# Patient Record
Sex: FEMALE | Race: WHITE | NOT HISPANIC OR LATINO | Employment: OTHER | URBAN - METROPOLITAN AREA
[De-identification: names, ages, dates, MRNs, and addresses within clinical notes are randomized per-mention and may not be internally consistent; named-entity substitution may affect disease eponyms.]

---

## 2024-01-03 PROBLEM — I25.10 CORONARY ARTERY DISEASE INVOLVING NATIVE CORONARY ARTERY OF NATIVE HEART WITHOUT ANGINA PECTORIS: Status: ACTIVE | Noted: 2024-01-03

## 2024-01-03 PROBLEM — R07.9 CHEST PAIN: Status: ACTIVE | Noted: 2024-01-03

## 2024-01-03 PROBLEM — I65.23 BILATERAL CAROTID ARTERY STENOSIS: Status: ACTIVE | Noted: 2024-01-03

## 2024-01-03 PROBLEM — I05.0 MITRAL VALVE STENOSIS: Status: ACTIVE | Noted: 2024-01-03

## 2024-01-03 PROBLEM — R00.2 PALPITATIONS: Status: ACTIVE | Noted: 2024-01-03

## 2024-01-03 PROBLEM — E03.9 HYPOTHYROIDISM: Status: ACTIVE | Noted: 2024-01-03

## 2024-01-03 PROBLEM — I50.32 CHRONIC DIASTOLIC CONGESTIVE HEART FAILURE (MULTI): Status: ACTIVE | Noted: 2024-01-03

## 2024-01-03 PROBLEM — E78.5 DYSLIPIDEMIA (HIGH LDL; LOW HDL): Status: ACTIVE | Noted: 2024-01-03

## 2024-01-03 PROBLEM — I25.2 HISTORY OF INFERIOR WALL MYOCARDIAL INFARCTION: Status: ACTIVE | Noted: 2024-01-03

## 2024-01-03 PROBLEM — I10 ESSENTIAL HYPERTENSION: Status: ACTIVE | Noted: 2024-01-03

## 2024-01-03 PROBLEM — E11.9 DIABETES (MULTI): Status: ACTIVE | Noted: 2024-01-03

## 2024-01-03 RX ORDER — TORSEMIDE 20 MG/1
2 TABLET ORAL DAILY
COMMUNITY
End: 2024-02-12 | Stop reason: SDUPTHER

## 2024-01-03 RX ORDER — ROSUVASTATIN CALCIUM 40 MG/1
1 TABLET, COATED ORAL NIGHTLY
COMMUNITY
End: 2024-02-12 | Stop reason: SDUPTHER

## 2024-01-03 RX ORDER — CALCITRIOL 0.25 UG/1
0.25 CAPSULE ORAL
COMMUNITY

## 2024-01-03 RX ORDER — PANTOPRAZOLE SODIUM 20 MG/1
1 TABLET, DELAYED RELEASE ORAL DAILY
COMMUNITY

## 2024-01-03 RX ORDER — GLIPIZIDE 2.5 MG/1
1 TABLET, EXTENDED RELEASE ORAL DAILY
COMMUNITY

## 2024-01-03 RX ORDER — NITROGLYCERIN 0.4 MG/1
0.4 TABLET SUBLINGUAL EVERY 5 MIN PRN
COMMUNITY
End: 2024-02-12 | Stop reason: SDUPTHER

## 2024-01-03 RX ORDER — METOPROLOL SUCCINATE 100 MG/1
1 TABLET, EXTENDED RELEASE ORAL DAILY
COMMUNITY
End: 2024-02-12 | Stop reason: SDUPTHER

## 2024-01-03 RX ORDER — ALLOPURINOL 100 MG/1
1 TABLET ORAL DAILY
COMMUNITY

## 2024-01-03 RX ORDER — LEVOTHYROXINE SODIUM 125 UG/1
1 TABLET ORAL DAILY
COMMUNITY
Start: 2022-01-20

## 2024-01-03 RX ORDER — CLOPIDOGREL BISULFATE 75 MG/1
1 TABLET ORAL DAILY
COMMUNITY
End: 2024-02-12 | Stop reason: SDUPTHER

## 2024-02-12 ENCOUNTER — OFFICE VISIT (OUTPATIENT)
Dept: CARDIOLOGY | Facility: CLINIC | Age: 84
End: 2024-02-12
Payer: MEDICARE

## 2024-02-12 VITALS
WEIGHT: 172.9 LBS | DIASTOLIC BLOOD PRESSURE: 72 MMHG | SYSTOLIC BLOOD PRESSURE: 118 MMHG | HEIGHT: 66 IN | BODY MASS INDEX: 27.79 KG/M2 | HEART RATE: 74 BPM

## 2024-02-12 DIAGNOSIS — I50.32 CHRONIC DIASTOLIC CONGESTIVE HEART FAILURE (MULTI): ICD-10-CM

## 2024-02-12 DIAGNOSIS — I47.10 PSVT (PAROXYSMAL SUPRAVENTRICULAR TACHYCARDIA) (CMS-HCC): ICD-10-CM

## 2024-02-12 DIAGNOSIS — I05.0 MITRAL VALVE STENOSIS, UNSPECIFIED ETIOLOGY: ICD-10-CM

## 2024-02-12 DIAGNOSIS — Z87.891 FORMER SMOKER: ICD-10-CM

## 2024-02-12 DIAGNOSIS — I10 ESSENTIAL HYPERTENSION: ICD-10-CM

## 2024-02-12 DIAGNOSIS — E78.5 DYSLIPIDEMIA (HIGH LDL; LOW HDL): ICD-10-CM

## 2024-02-12 DIAGNOSIS — I65.23 BILATERAL CAROTID ARTERY STENOSIS: ICD-10-CM

## 2024-02-12 DIAGNOSIS — E11.9 TYPE 2 DIABETES MELLITUS WITHOUT COMPLICATION, WITHOUT LONG-TERM CURRENT USE OF INSULIN (MULTI): ICD-10-CM

## 2024-02-12 DIAGNOSIS — K27.9 PUD (PEPTIC ULCER DISEASE): ICD-10-CM

## 2024-02-12 DIAGNOSIS — N18.30 STAGE 3 CHRONIC KIDNEY DISEASE, UNSPECIFIED WHETHER STAGE 3A OR 3B CKD (MULTI): ICD-10-CM

## 2024-02-12 DIAGNOSIS — I48.0 PAROXYSMAL ATRIAL FIBRILLATION (MULTI): ICD-10-CM

## 2024-02-12 DIAGNOSIS — R00.2 PALPITATIONS: ICD-10-CM

## 2024-02-12 DIAGNOSIS — I25.10 CORONARY ARTERY DISEASE INVOLVING NATIVE CORONARY ARTERY OF NATIVE HEART WITHOUT ANGINA PECTORIS: ICD-10-CM

## 2024-02-12 DIAGNOSIS — R60.9 EDEMA, UNSPECIFIED TYPE: Primary | ICD-10-CM

## 2024-02-12 PROBLEM — N25.81 HYPERPARATHYROIDISM DUE TO RENAL INSUFFICIENCY (MULTI): Status: ACTIVE | Noted: 2019-01-18

## 2024-02-12 PROBLEM — I13.10 HYPERTENSIVE HEART AND CHRONIC KIDNEY DISEASE WITHOUT HEART FAILURE, WITH STAGE 1 THROUGH STAGE 4 CHRONIC KIDNEY DISEASE, OR UNSPECIFIED CHRONIC KIDNEY DISEASE: Status: ACTIVE | Noted: 2019-01-18

## 2024-02-12 PROBLEM — N17.9 ACUTE RENAL FAILURE SYNDROME (CMS-HCC): Status: ACTIVE | Noted: 2019-01-18

## 2024-02-12 PROBLEM — R00.0 WIDE-COMPLEX TACHYCARDIA: Status: ACTIVE | Noted: 2024-02-12

## 2024-02-12 PROCEDURE — 3074F SYST BP LT 130 MM HG: CPT | Performed by: INTERNAL MEDICINE

## 2024-02-12 PROCEDURE — 1159F MED LIST DOCD IN RCRD: CPT | Performed by: INTERNAL MEDICINE

## 2024-02-12 PROCEDURE — 99214 OFFICE O/P EST MOD 30 MIN: CPT | Performed by: INTERNAL MEDICINE

## 2024-02-12 PROCEDURE — 3078F DIAST BP <80 MM HG: CPT | Performed by: INTERNAL MEDICINE

## 2024-02-12 PROCEDURE — 1036F TOBACCO NON-USER: CPT | Performed by: INTERNAL MEDICINE

## 2024-02-12 RX ORDER — LISINOPRIL 5 MG/1
5 TABLET ORAL DAILY
COMMUNITY

## 2024-02-12 RX ORDER — NITROGLYCERIN 0.4 MG/1
0.4 TABLET SUBLINGUAL EVERY 5 MIN PRN
Qty: 25 TABLET | Refills: 5 | Status: SHIPPED | OUTPATIENT
Start: 2024-02-12 | End: 2025-02-11

## 2024-02-12 RX ORDER — CLOPIDOGREL BISULFATE 75 MG/1
75 TABLET ORAL DAILY
Qty: 90 TABLET | Refills: 3 | Status: SHIPPED | OUTPATIENT
Start: 2024-02-12 | End: 2025-02-11

## 2024-02-12 RX ORDER — METOPROLOL SUCCINATE 100 MG/1
100 TABLET, EXTENDED RELEASE ORAL DAILY
Qty: 90 TABLET | Refills: 3 | Status: SHIPPED | OUTPATIENT
Start: 2024-02-12 | End: 2025-02-11

## 2024-02-12 RX ORDER — ROSUVASTATIN CALCIUM 40 MG/1
40 TABLET, COATED ORAL NIGHTLY
Qty: 90 TABLET | Refills: 3 | Status: SHIPPED | OUTPATIENT
Start: 2024-02-12 | End: 2025-02-11

## 2024-02-12 NOTE — PROGRESS NOTES
CARDIOLOGY OFFICE VISIT      CHIEF COMPLAINT  Chief Complaint   Patient presents with    Follow-up     1 year follow up        HISTORY OF PRESENT ILLNESS  The patient is a 83-year-old  female with past medical history significant for coronary artery disease, carotid artery stenosis, hypertension, hyperlipidemia, who presents for followup cardiovascular care.      Patient denies chest pain, palpitations, nausea, vomiting, dizziness, near-syncope. Patient has chronic lower extremity edema left greater than right. Denies bleeding. No formal exercise. Patient lives in [place] a 45 mile drive from here.   No formal exercise program.        PAST SURGICAL HISTORY  Thyroidectomy.  Tubal ligation.   Left upper extremity fistula  Left total hip replacement        REVIEW OF SYSTEMS: Negative or noncontributory as previously mentioned Ã--12 systems.      SOCIAL HISTORY: Denies alcohol. Quit smoking 14 years ago. Was a one-pack per  day for 30 years smoker.      FAMILY HISTORY: Significant for dad  at 70 with cerebrovascular  accident. Mom  at 91 with congestive heart failure. Brother   at 74 with coronary artery disease.      I personally reviewed EKG 2023: Normal sinus rhythm with frequent premature atrial contractions, ventricular conduction delay     ASSESSMENT:   Wide-complex tachycardia- declines cardiac catheterization  Possible paroxysmal atrial fibrillation-declines anticoagulation  Paroxysmal supraventricular tachycardia  Coronary artery disease, status post multivessel angioplasty, most recent drug-eluting stent, first obtuse marginal branch 2006, chronically occluded, dominant right coronary artery.  Acute on chronic diastolic congestive heart failure [place] heart association class II  Carotid artery stenosis status post left internal carotid artery stent, Olya 3, 2015.  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  Lambl's excrescences of  aortic valve.  History of inferior wall myocardial infarction.  Hypertension.  Hyperlipidemia.  Diabetes  Peptic ulcer disease.  Renal insufficiency-follows with Dr Llanos in [place]   Hypothyroid.     RECOMMENDATIONS:   I have had  lengthy discussions regarding the findings on previous 28-day event monitor. She is having wide-complex tachycardia, possible brief episodes of atrial fibrillation, and SVT. I explained that the wide-complex tachycardia or ventricular tachycardia is most commonly associated with severe occlusive coronary artery disease. She did have an abnormal stress test last year that was consistent with her chronically occluded RCA. She has no current symptoms of angina. I offered cardiac catheterization to further evaluate due to concerns that ventricular tachycardia cannot lead to sudden death. She is more concerned about declining renal function with the use of contrast. I also discussed my concerns of future stroke and recommending anticoagulation warfarin. Currently she continues to decline. She has had an embolic event in the past and her valvular heart disease, severely dilated left atrium, and Holter findings would put her at increased risk for future embolic events.  For her edema we will prescribe compression stockings knee-high 15-20 mmHg.  Follow-up with myself in 1 year.  Routine lab work through PCP at [facility].     Please excuse any errors in grammar or translation related to this dictation. Voice recognition software was utilized to prepare this document.        Recent Cardiovascular Testing:  The following results have been reviewed and updated.     Cardiac cath: 5/17/18  1. MICHELLE 50-69%.  2. LICA less than 50%.     MPX: 9/8/21  1. Mild inferolateral wall aminah-infarct myocardial ischemia.  2. Moderate inferolateral wall myocardial infarction.  3. EF 65%     Labs: 7/21/21  1., Trig 139, LDL 53 , HDL 34      DEVIKA 5/6/2015  1.EF 65%  2.Moderate septal hypertrophy  3. Lambl's  excretion  4.Thickened Mitral valve  5.Aortic valve trileaflet     Echo: 9/8/21  1. EF 60-65%  2. Left atrium severely dilated.  3. mild to moderate Mitral valve stenosis.  4. Moderately elevated pulmonary artery pressure.  5. Mitral valve stenosis, mean and peak gradients 6.0 mmHg and 15.6 mmHg.  6. Mild MR.     CRD: 9/8/21  1. Mild carotid artery stenosis.    VITALS  Vitals:    02/12/24 1252   BP: 118/72   Pulse: 74     Wt Readings from Last 4 Encounters:   02/12/24 78.4 kg (172 lb 14.4 oz)   02/13/23 79.5 kg (175 lb 5 oz)   10/17/22 81.8 kg (180 lb 7 oz)   07/18/22 84.8 kg (187 lb)       PHYSICAL EXAM:  GENERAL:  Well developed, well nourished, in no acute distress.  CHEST:  Symmetric and nontender.  NEURO/PSYCH:  Alert and oriented times three with approppriate behavior and responses.  NECK:  Supple, no JVD, no bruit.  LUNGS:  Clear to auscultation bilaterally, normal respiratory effort.  HEART:  Rate and rhythm regular with 2/6 KESHA at LSB, no gallop appreciated.    There are no rubs, clicks or heaves.  EXTREMITIES:  Warm with good color, no clubbing or cyanosis.  There is 1+ bilateral lower extremity edema noted.  PERIPHERAL VASCULAR:  Pulses present and equally palpable; 2+ throughout.    ASSESSMENT AND PLAN  Diagnoses and all orders for this visit:  Palpitations  Paroxysmal atrial fibrillation (CMS/[facility])  PSVT (paroxysmal supraventricular tachycardia)  Coronary artery disease involving native coronary artery of native heart without angina pectoris  Chronic diastolic congestive heart failure (CMS/[facility])  Bilateral carotid artery stenosis  Essential hypertension  Dyslipidemia (high LDL; low HDL)  Mitral valve stenosis, unspecified etiology  Type 2 diabetes mellitus without complication, without long-term current use of insulin (CMS/[facility])  PUD (peptic ulcer disease)  Stage 3 chronic kidney disease, unspecified whether stage 3a or 3b CKD (CMS/[facility])  BMI 27.0-27.9,adult  Former smoker      Past Medical History  Past  Medical History:   Diagnosis Date    Coronary artery disease     Diabetes mellitus (CMS/HCC)     Hyperlipidemia     Hypertension        Social History  Social History     Tobacco Use    Smoking status: Former     Types: Cigarettes     Quit date:      Years since quittin.1     Passive exposure: Never    Smokeless tobacco: Never   Substance Use Topics    Alcohol use: Never    Drug use: Never       Family History     Family History   Problem Relation Name Age of Onset    Heart failure Mother      Coronary artery disease Father      Stroke Father      Diabetes Sister      Coronary artery disease Brother          Allergies:  Allergies   Allergen Reactions    Atorvastatin Myalgia    Ciprofloxacin Unknown    Ezetimibe-Simvastatin Myalgia    Fenofibrate Myalgia    Nitrofurantoin Monohyd/M-Cryst Unknown    Statins-Hmg-Coa Reductase Inhibitors Myalgia        Outpatient Medications:  Current Outpatient Medications   Medication Instructions    allopurinol (Zyloprim) 100 mg tablet 1 tablet, oral, Daily    calcitriol (ROCALTROL) 0.25 mcg, oral, Take one capsule on Mon, Wed, Fri    clopidogrel (Plavix) 75 mg tablet 1 tablet, oral, Daily    glipiZIDE XL (Glucotrol XL) 2.5 mg 24 hr tablet 1 tablet, oral, Daily    levothyroxine (Synthroid, Levoxyl) 125 mcg tablet 1 tablet, oral, Daily    lisinopril 5 mg, oral, Daily    metoprolol succinate XL (Toprol-XL) 100 mg 24 hr tablet 1 tablet, oral, Daily    nitroglycerin (NITROSTAT) 0.4 mg, sublingual, Every 5 min PRN    pantoprazole (ProtoNix) 20 mg EC tablet 1 tablet, oral, Daily    rosuvastatin (Crestor) 40 mg tablet 1 tablet, oral, Nightly    SITagliptin phosphate (Januvia) 50 mg tablet 1 tablet, oral, Daily    torsemide (Demadex) 20 mg tablet 2 tablets, oral, Daily        Recent Lab Results:    CMP:    Lab Results   Component Value Date     2021    K 4.1 2021    CL 99 2021    CO2 27 2021    BUN 51 (H) 2021    CREATININE 3.30 (H) 2021     "GLUCOSE 168 (H) 07/19/2021    CALCIUM 9.5 07/19/2021       Magnesium:    No results found for: \"MG\"    Lipid Profile:    Lab Results   Component Value Date    TRIG 139 07/19/2021    HDL 34.0 (A) 07/19/2021       TSH:    Lab Results   Component Value Date    TSH 1.08 05/23/2019       BNP:   No results found for: \"BNP\"     PT/INR:    No results found for: \"PROTIME\", \"INR\"    HgBA1c:    Lab Results   Component Value Date    HGBA1C 8.3 07/26/2021       BMP:  Lab Results   Component Value Date     07/19/2021     05/28/2020     05/23/2019    K 4.1 07/19/2021    K 4.2 05/28/2020    K 3.8 05/23/2019    CL 99 07/19/2021    CL 99 05/28/2020     05/23/2019    CO2 27 07/19/2021    CO2 28 05/28/2020    CO2 27 05/23/2019    BUN 51 (H) 07/19/2021    BUN 43 (H) 05/28/2020    BUN 39 (H) 05/23/2019    CREATININE 3.30 (H) 07/19/2021    CREATININE 2.78 (H) 05/28/2020    CREATININE 2.14 (H) 05/23/2019       CBC:  No results found for: \"WBC\", \"RBC\", \"HGB\", \"HCT\", \"MCV\", \"MCH\", \"MCHC\", \"RDW\", \"PLT\", \"MPV\"    Cardiac Enzymes:    No results found for: \"TROPHS\"    Hepatic Function Panel:    Lab Results   Component Value Date    ALKPHOS 75 07/19/2021    ALT 5 (L) 07/19/2021    AST 10 07/19/2021    PROT 6.9 07/19/2021    BILITOT 0.4 07/19/2021           Silverio Lawton, DO         "

## 2024-02-12 NOTE — PATIENT INSTRUCTIONS
FOLLOW UP IN 1 YEAR    COMPRESSION STOCKINGS ORDERED  
Interventions: coordination of care Monitoring/evaluation: nutrient intake, weight, electrolyte profile/yes

## 2024-02-14 ENCOUNTER — TELEPHONE (OUTPATIENT)
Dept: CARDIOLOGY | Facility: CLINIC | Age: 84
End: 2024-02-14
Payer: MEDICARE

## 2024-10-10 ENCOUNTER — TELEPHONE (OUTPATIENT)
Dept: CARDIOLOGY | Facility: CLINIC | Age: 84
End: 2024-10-10
Payer: MEDICARE

## 2024-10-10 NOTE — TELEPHONE ENCOUNTER
Received referral from LINDY Franklin. This is a patient of Dr. Lawton. I called patient and she advised she is wanting to switch to a provider in [place] and would like to see Dr. Bairon Rizvi MD.   Dr. Lawton last saw patient February 2024 and recommended a 1-year follow up.   Ok to add patient to your schedule? If so, schedule for February 2025 or sooner?

## 2024-11-15 LAB
NON-UH HIE ALANINE AMINOTRANSFERASE:CCNC:PT:SER/PLAS:QN:NO ADDITION OF P-5': 7 INT._UNIT/L (ref 6–46)
NON-UH HIE ALBUMIN/GLOBULIN:MCRTO:PT:SER:QN:: 1.1 (ref 1.1–2.2)
NON-UH HIE ALBUMIN:MCNC:PT:SER/PLAS:QN:: 3.7 GM/DL (ref 3.3–5)
NON-UH HIE ALKALINE PHOSPHATASE:CCNC:PT:SER/PLAS:QN:: 93 INT._UNIT/L (ref 21–98)
NON-UH HIE ANION GAP:SCNC:PT:SER/PLAS:QN:: 16 MEQ/L (ref 6–16)
NON-UH HIE ASPARTATE AMINOTRANSFERASE:CCNC:PT:SER/PLAS:QN:: 19 INT._UNIT/L (ref 5–43)
NON-UH HIE BASOPHILS/LEUKOCYTES:NFR.DF:PT:BLD:QN:AUTOMATED COUNT: 0 E9/L (ref 0–0.2)
NON-UH HIE BASOPHILS:NCNC:PT:BLD:QN:AUTOMATED COUNT: 0.2 % (ref 0–2)
NON-UH HIE BILIRUBIN.GLUCURONIDATED+BILIRUBIN.ALBUMIN BOUND:MCNC:PT:SER/PLA: 0.3 MG/DL (ref 0–0.4)
NON-UH HIE BILIRUBIN.NON-GLUCURONIDATED:MSCNC:PT:SER/PLAS:QN:: 0.6 MG/DL (ref 0.1–0.9)
NON-UH HIE BILIRUBIN:MCNC:PT:SER/PLAS:QN:: 0.9 MG/DL (ref 0–1.1)
NON-UH HIE CALCIUM:MCNC:PT:SER/PLAS:QN:: 9.6 MG/DL (ref 8.9–11.1)
NON-UH HIE CARBON DIOXIDE:SCNC:PT:SER/PLAS:QN:: 27 MMOL/L (ref 21–31)
NON-UH HIE CHLORIDE:SCNC:PT:SER/PLAS:QN:: 96 MMOL/L (ref 101–111)
NON-UH HIE COAGULATION SURFACE INDUCED:TIME:PT:PPP:QN:COAG: 30.3 SECOND(S) (ref 25.1–36.5)
NON-UH HIE COAGULATION TISSUE FACTOR INDUCED.INR:RELTIME:PT:PPP:QN:COAG: 1.41
NON-UH HIE COAGULATION TISSUE FACTOR INDUCED:TIME:PT:PPP:QN:COAG: 15.9 SECOND(S) (ref 9.4–12.5)
NON-UH HIE CREATININE:MCNC:PT:SER/PLAS:QN:: 2 MG/DL (ref 0.5–1.3)
NON-UH HIE EGFR: 24 ML/MIN/1.73 M2
NON-UH HIE EOSINOPHILS/100 LEUKOCYTES:NFR:PT:BLD:QN:AUTOMATED COUNT: 0 % (ref 0–8)
NON-UH HIE EOSINOPHILS:NCNC:PT:BLD:QN:: 0 E9/L (ref 0–0.5)
NON-UH HIE ERYTHROCYTE DISTRIBUTION WIDTH:RATIO:PT:RBC:QN:AUTOMATED COUNT: 17.1 % (ref 10.9–14.2)
NON-UH HIE ERYTHROCYTE MEAN CORPUSCULAR HEMOGLOBIN CONCENTRATION:MCNC:PT:RB: 32.9 GM/DL (ref 31.4–36)
NON-UH HIE ERYTHROCYTE MEAN CORPUSCULAR HEMOGLOBIN:ENTMASS:PT:RBC:QN:AUTOMA: 29.6 PG (ref 27–34)
NON-UH HIE ERYTHROCYTE MEAN CORPUSCULAR VOLUME:ENTVOL:PT:RBC:QN:AUTOMATED C: 89.9 FL (ref 80–100)
NON-UH HIE ERYTHROCYTES:NCNC:PT:BLD:QN:AUTOMATED COUNT: 3.7 E12/L (ref 4.3–5.9)
NON-UH HIE ETHANOL LVL: <10 MG/DL
NON-UH HIE GLOBULIN:MCNC:PT:SER:QN:CALCULATED: 3.4 GM/DL (ref 1.4–4)
NON-UH HIE GLUCOSE:MCNC:PT:SER/PLAS:QN:: 254 MG/DL (ref 55–199)
NON-UH HIE HEMATOCRIT:VFR:PT:BLD:QN:AUTOMATED COUNT: 33 % (ref 34–46)
NON-UH HIE HEMOGLOBIN:MCNC:PT:BLD:QN:: 10.9 GM/DL (ref 12–16)
NON-UH HIE LACTIC ACID LVL: 2.2 MMOL/L (ref 0.5–2.2)
NON-UH HIE LEUKOCYTES: 10.8 E9/L (ref 4–11)
NON-UH HIE LYMPHOCYTES:NCNC:PT:BLD:QN:: 0.2 E9/L (ref 1–4)
NON-UH HIE LYMPHOCYTES:NCNC:PT:BLD:QN:AUTOMATED COUNT: 2.2 % (ref 14–50)
NON-UH HIE MONOCYTES:NCNC:PT:BLD:QN:AUTOMATED COUNT: 0.6 E9/L (ref 0.2–1)
NON-UH HIE NEUTROPHILS/100 LEUKOCYTES:NFR:PT:BLD:QN:: 92.2 % (ref 36–75)
NON-UH HIE NEUTROPHILS:NCNC:PT:BLD:QN:AUTOMATED COUNT: 10 E9/L (ref 2–7.5)
NON-UH HIE PLATELET MEAN VOLUME:ENTVOL:PT:BLD:QN:AUTOMATED COUNT: 7.7 FL (ref 6.4–10.8)
NON-UH HIE PLATELETS:NCNC:PT:BLD:QN:AUTOMATED COUNT: 217 E9/L (ref 150–500)
NON-UH HIE POTASSIUM:SCNC:PT:SER/PLAS:QN:: 3.8 MMOL/L (ref 3.5–5.3)
NON-UH HIE PROTEIN:MCNC:PT:SER/PLAS:QN:: 7.1 GM/DL (ref 6–7.8)
NON-UH HIE SODIUM:SCNC:PT:SER/PLAS:QN:: 135 MMOL/L (ref 135–145)
NON-UH HIE TOTAL CK: 479 INT._UNIT/L (ref 14–261)
NON-UH HIE TRIACYLGLYCEROL LIPASE:CCNC:PT:SER/PLAS:QN:: 27 UNIT/L (ref 13–58)
NON-UH HIE TROPONIN: 327.2 PG/ML (ref 10.1–27.1)
NON-UH HIE UREA NITROGEN/CREATININE:MRTO:PT:SER/PLAS:QN:: 17 NO UNITS (ref 10–20)
NON-UH HIE UREA NITROGEN:MCNC:PT:SER/PLAS:QN:: 34 MG/DL (ref 5–21)

## 2025-02-03 ENCOUNTER — APPOINTMENT (OUTPATIENT)
Dept: CARDIOLOGY | Facility: CLINIC | Age: 85
End: 2025-02-03
Payer: MEDICARE

## 2025-02-03 VITALS
DIASTOLIC BLOOD PRESSURE: 64 MMHG | HEART RATE: 80 BPM | BODY MASS INDEX: 25.88 KG/M2 | WEIGHT: 161 LBS | HEIGHT: 66 IN | SYSTOLIC BLOOD PRESSURE: 130 MMHG

## 2025-02-03 DIAGNOSIS — I13.10 HYPERTENSIVE HEART AND CHRONIC KIDNEY DISEASE WITHOUT HEART FAILURE, WITH STAGE 1 THROUGH STAGE 4 CHRONIC KIDNEY DISEASE, OR UNSPECIFIED CHRONIC KIDNEY DISEASE: ICD-10-CM

## 2025-02-03 DIAGNOSIS — I50.32 CHRONIC DIASTOLIC CONGESTIVE HEART FAILURE: ICD-10-CM

## 2025-02-03 DIAGNOSIS — I25.10 CORONARY ARTERY DISEASE INVOLVING NATIVE CORONARY ARTERY OF NATIVE HEART WITHOUT ANGINA PECTORIS: ICD-10-CM

## 2025-02-03 DIAGNOSIS — I10 ESSENTIAL HYPERTENSION: ICD-10-CM

## 2025-02-03 DIAGNOSIS — E78.5 DYSLIPIDEMIA (HIGH LDL; LOW HDL): ICD-10-CM

## 2025-02-03 DIAGNOSIS — I05.0 MITRAL VALVE STENOSIS, UNSPECIFIED ETIOLOGY: ICD-10-CM

## 2025-02-03 DIAGNOSIS — E11.9 TYPE 2 DIABETES MELLITUS WITHOUT COMPLICATION, WITHOUT LONG-TERM CURRENT USE OF INSULIN (MULTI): ICD-10-CM

## 2025-02-03 DIAGNOSIS — I48.0 PAROXYSMAL ATRIAL FIBRILLATION (MULTI): ICD-10-CM

## 2025-02-03 DIAGNOSIS — I65.23 BILATERAL CAROTID ARTERY STENOSIS: ICD-10-CM

## 2025-02-03 DIAGNOSIS — Z87.891 FORMER SMOKER: ICD-10-CM

## 2025-02-03 PROCEDURE — 3078F DIAST BP <80 MM HG: CPT | Performed by: INTERNAL MEDICINE

## 2025-02-03 PROCEDURE — 1036F TOBACCO NON-USER: CPT | Performed by: INTERNAL MEDICINE

## 2025-02-03 PROCEDURE — 93000 ELECTROCARDIOGRAM COMPLETE: CPT | Performed by: INTERNAL MEDICINE

## 2025-02-03 PROCEDURE — 99215 OFFICE O/P EST HI 40 MIN: CPT | Performed by: INTERNAL MEDICINE

## 2025-02-03 PROCEDURE — 3075F SYST BP GE 130 - 139MM HG: CPT | Performed by: INTERNAL MEDICINE

## 2025-02-03 RX ORDER — LEVETIRACETAM 750 MG/1
750 TABLET ORAL 2 TIMES DAILY
COMMUNITY
Start: 2025-01-18

## 2025-02-03 RX ORDER — LANOLIN ALCOHOL/MO/W.PET/CERES
1000 CREAM (GRAM) TOPICAL DAILY
COMMUNITY

## 2025-02-03 RX ORDER — SITAGLIPTIN 25 MG/1
1 TABLET, FILM COATED ORAL
COMMUNITY
Start: 2025-01-27

## 2025-02-03 RX ORDER — DOCUSATE SODIUM 100 MG/1
1 CAPSULE, LIQUID FILLED ORAL
COMMUNITY
Start: 2024-11-22

## 2025-02-03 RX ORDER — ROSUVASTATIN CALCIUM 20 MG/1
20 TABLET, COATED ORAL NIGHTLY
COMMUNITY
Start: 2024-11-27

## 2025-02-03 RX ORDER — APIXABAN 2.5 MG/1
2.5 TABLET, FILM COATED ORAL 2 TIMES DAILY
COMMUNITY

## 2025-02-03 RX ORDER — METOPROLOL TARTRATE 25 MG/1
12.5 TABLET, FILM COATED ORAL 2 TIMES DAILY
COMMUNITY
Start: 2025-01-08

## 2025-02-03 RX ORDER — POTASSIUM CHLORIDE 750 MG/1
TABLET, EXTENDED RELEASE ORAL
COMMUNITY
Start: 2025-01-12

## 2025-02-03 RX ORDER — TORSEMIDE 20 MG/1
2 TABLET ORAL
COMMUNITY
Start: 2025-01-25 | End: 2025-02-03 | Stop reason: ENTERED-IN-ERROR

## 2025-02-03 NOTE — PROGRESS NOTES
Subjective   Michelle Fernandez is a 84 y.o. female            HPI   84-year-old white female who has previously followed with Dr. Silverio mosley and she is here now for follow-up, she lives in [place] and prefers close our office for visits.  She came with her son.  Since she saw Dr. Lawton last February she had a stroke in September 2024 and was sent to [place] but no intervention was needed.  She had an echocardiogram which revealed normal ejection fraction, she has history of coronary disease and previous stenting in the last cardiac catheterization 2015 revealed occlusion of the RCA with left-to-right collaterals and minimal disease on the left side.  She has permanent atrial fibrillation which was diagnosed back in September 2024.  She is on Eliquis 2.5 mg twice daily which is the right dose for her.  She has stage IV chronic kidney disease and GFR of 26 mL/min.  She uses a walker for ambulation and denies any palpitations, chest pain, bleeding complications and her diabetes seems to be under control A1c below 7.  Her examination was remarkable for irregular rhythm.  Her blood pressure is under control.  She had normal lung examination, no carotid bruits and no lower extremity edema.  Her previous records were extensively reviewed and shared with the patient and her son.  She continue to follow with local neurology and nephrology.  Assessment/recommendations:    1-permanent atrial fibrillation on Eliquis with controlled rate.  No changes are needed.  2-CAD status post previous PCI of the LAD, no recent imaging studies, last cardiac catheterization 2015.  She is on Plavix and high intensity statin.  No symptoms are present and no cardiac testing is needed  3-stage IV chronic kidney disease, avoid nephrotoxic medication and maintain adequate hydration  4-stroke September 2024 with no significant residual deficit with less than 70% stenosis in the carotid arteries.  Presently on high intensity statin and Plavix,  "non-smoker with controlled diabetes  5-essential hypertension, currently under control  6-dyslipidemia on high intensity statin to be monitored  7-type 2 diabetes under control managed by PCP  8-hypothyroidism on replacement therapy  9-high risk medication with Eliquis and Plavix with no active bleeding  Review of Systems   All other systems reviewed and are negative.           Vitals:    02/03/25 1259   BP: 130/64   BP Location: Right arm   Patient Position: Sitting   Pulse: 80   Weight: 73 kg (161 lb)   Height: 1.676 m (5' 6\")        Objective   Physical Exam  Constitutional:       Appearance: Normal appearance.   HENT:      Nose: Nose normal.   Neck:      Vascular: No carotid bruit.   Cardiovascular:      Rate and Rhythm: Normal rate.      Pulses: Normal pulses.      Heart sounds: Normal heart sounds.   Pulmonary:      Effort: Pulmonary effort is normal.   Abdominal:      General: Bowel sounds are normal.      Palpations: Abdomen is soft.   Musculoskeletal:         General: Normal range of motion.      Cervical back: Normal range of motion.      Right lower leg: No edema.      Left lower leg: No edema.   Skin:     General: Skin is warm and dry.   Neurological:      General: No focal deficit present.      Mental Status: She is alert.   Psychiatric:         Mood and Affect: Mood normal.         Behavior: Behavior normal.         Thought Content: Thought content normal.         Judgment: Judgment normal.         Allergies  Atorvastatin, Ciprofloxacin, Ezetimibe-simvastatin, Fenofibrate, Nitrofurantoin monohyd/m-cryst, and Statins-hmg-coa reductase inhibitors     Current Medications    Current Outpatient Medications:     allopurinol (Zyloprim) 100 mg tablet, Take 1 tablet (100 mg) by mouth once daily., Disp: , Rfl:     calcitriol (Rocaltrol) 0.25 mcg capsule, Take 1 capsule (0.25 mcg) by mouth. Take one capsule on Mon, Wed, Fri, Disp: , Rfl:     clopidogrel (Plavix) 75 mg tablet, Take 1 tablet (75 mg) by mouth once " daily., Disp: 90 tablet, Rfl: 3    cyanocobalamin (Vitamin B-12) 1,000 mcg tablet, Take 1 tablet (1,000 mcg) by mouth once daily., Disp: , Rfl:     docusate sodium (Colace) 100 mg capsule, Take 1 capsule (100 mg) by mouth every 12 hours., Disp: , Rfl:     Eliquis 2.5 mg tablet, 1 tablet (2.5 mg) 2 times a day., Disp: , Rfl:     Januvia 25 mg tablet, Take 1 tablet (25 mg) by mouth early in the morning.., Disp: , Rfl:     levETIRAcetam (Keppra) 750 mg tablet, Take 1 tablet (750 mg) by mouth 2 times a day., Disp: , Rfl:     levothyroxine (Synthroid, Levoxyl) 125 mcg tablet, Take 1 tablet (125 mcg) by mouth once daily., Disp: , Rfl:     metoprolol tartrate (Lopressor) 25 mg tablet, Take 0.5 tablets (12.5 mg) by mouth 2 times a day., Disp: , Rfl:     nitroglycerin (Nitrostat) 0.4 mg SL tablet, Place 1 tablet (0.4 mg) under the tongue every 5 minutes if needed for chest pain., Disp: 25 tablet, Rfl: 5    potassium chloride CR 10 mEq ER tablet, TAKE 1 TABLET BY MOUTH ONCE DAILY on days you take torsemide, Disp: , Rfl:     rosuvastatin (Crestor) 20 mg tablet, Take 1 tablet (20 mg) by mouth once daily at bedtime., Disp: , Rfl:     torsemide 40 mg tablet, Take 40 mg by mouth once daily., Disp: 90 tablet, Rfl: 3    metoprolol succinate XL (Toprol-XL) 100 mg 24 hr tablet, Take 1 tablet (100 mg) by mouth once daily., Disp: 90 tablet, Rfl: 3    rosuvastatin (Crestor) 40 mg tablet, Take 1 tablet (40 mg) by mouth once daily at bedtime., Disp: 90 tablet, Rfl: 3                     Assessment/Plan   1. Coronary artery disease involving native coronary artery of native heart without angina pectoris  Follow Up In Cardiology      2. Bilateral carotid artery stenosis        3. Chronic diastolic congestive heart failure        4. Essential hypertension        5. Mitral valve stenosis, unspecified etiology        6. Paroxysmal atrial fibrillation (Multi)  ECG 12 Lead      7. Hypertensive heart and chronic kidney disease without heart  failure, with stage 1 through stage 4 chronic kidney disease, or unspecified chronic kidney disease        8. Dyslipidemia (high LDL; low HDL)        9. Type 2 diabetes mellitus without complication, without long-term current use of insulin (Multi)        10. Former smoker        11. BMI 25.0-25.9,adult                 Scribe Attestation  By signing my name below, I, Adelaide GARCIA RN   , Scribe   attest that this documentation has been prepared under the direction and in the presence of Bairon Rizvi MD.     Provider Attestation - Scribe documentation    All medical record entries made by the Scribe were at my direction and personally dictated by me. I have reviewed the chart and agree that the record accurately reflects my personal performance of the history, physical exam, discussion and plan.

## 2025-02-03 NOTE — PATIENT INSTRUCTIONS
Please bring all medicines, vitamins, and herbal supplements with you when you come to the office.    Prescriptions will not be filled unless you are compliant with your follow up appointments or have a follow up appointment scheduled as per instruction of your physician. Refills should be requested at the time of your visit.     Fall Prevention Education Given     BMI was above normal measurement. Current weight: 73 kg (161 lb)  Weight change since last visit (-) denotes wt loss -11.9 lbs   Weight loss needed to achieve BMI 25: 6.4 Lbs  Weight loss needed to achieve BMI 30: -24.5 Lbs  Provided instructions on dietary changes  Provided instructions on exercise.    Call to confirm rosuvastatin dose whether it is 20 mg or 40 mg and to confirm what dose of torsemide you are taking

## 2025-02-03 NOTE — LETTER
February 3, 2025     Sandeep Rojo,   [address]  [facility] [address]    Patient: Michelle Fernandez   YOB: 1940   Date of Visit: 2/3/2025       Dear Dr. Sandeep Rojo, DO:    Thank you for referring Michelle Fernandez to me for evaluation. Below are my notes for this consultation.  If you have questions, please do not hesitate to call me. I look forward to following your patient along with you.       Sincerely,     Bairon Rizvi MD      CC: No Recipients  ______________________________________________________________________________________

## 2025-02-12 ENCOUNTER — APPOINTMENT (OUTPATIENT)
Dept: CARDIOLOGY | Facility: CLINIC | Age: 85
End: 2025-02-12
Payer: MEDICARE

## 2025-08-04 ENCOUNTER — TELEPHONE (OUTPATIENT)
Dept: CARDIOLOGY | Facility: CLINIC | Age: 85
End: 2025-08-04
Payer: MEDICARE

## 2025-08-04 NOTE — TELEPHONE ENCOUNTER
Patient son bhavani phoned into the office reports patient discharged from [facility] 07/21/2025. Patient son was advised to arrange for sooner OV as follow up from hospital stay. Need to obtained records and update med list then send to Dr. Bairon Rizvi MD to see if further testings is needed and where to move up appt.

## 2025-08-05 RX ORDER — FUROSEMIDE 40 MG/1
40 TABLET ORAL
COMMUNITY

## 2025-08-05 RX ORDER — MIRTAZAPINE 15 MG/1
15 TABLET, ORALLY DISINTEGRATING ORAL NIGHTLY
COMMUNITY

## 2025-08-05 RX ORDER — CLOPIDOGREL BISULFATE 75 MG/1
75 TABLET ORAL DAILY
COMMUNITY

## 2025-08-05 RX ORDER — PANTOPRAZOLE SODIUM 20 MG/1
20 TABLET, DELAYED RELEASE ORAL
COMMUNITY

## 2025-08-06 NOTE — TELEPHONE ENCOUNTER
Phoned patient's son, Octaviano. He states that patient is back at [facility] as she went back their Monday night, 8/4/25. He will update office if he has further concerns once she is discharged.

## 2025-09-25 ENCOUNTER — APPOINTMENT (OUTPATIENT)
Dept: CARDIOLOGY | Facility: CLINIC | Age: 85
End: 2025-09-25
Payer: MEDICARE